# Patient Record
Sex: FEMALE | Race: WHITE | NOT HISPANIC OR LATINO | URBAN - METROPOLITAN AREA
[De-identification: names, ages, dates, MRNs, and addresses within clinical notes are randomized per-mention and may not be internally consistent; named-entity substitution may affect disease eponyms.]

---

## 2020-07-25 ENCOUNTER — TELEPHONE ENCOUNTER (OUTPATIENT)
Dept: URBAN - METROPOLITAN AREA CLINIC 13 | Facility: CLINIC | Age: 70
End: 2020-07-25

## 2020-07-26 ENCOUNTER — TELEPHONE ENCOUNTER (OUTPATIENT)
Dept: URBAN - METROPOLITAN AREA CLINIC 13 | Facility: CLINIC | Age: 70
End: 2020-07-26

## 2023-09-27 ENCOUNTER — OFFICE VISIT (OUTPATIENT)
Dept: URBAN - METROPOLITAN AREA CLINIC 107 | Facility: CLINIC | Age: 73
End: 2023-09-27
Payer: COMMERCIAL

## 2023-09-27 ENCOUNTER — LAB OUTSIDE AN ENCOUNTER (OUTPATIENT)
Dept: URBAN - METROPOLITAN AREA CLINIC 107 | Facility: CLINIC | Age: 73
End: 2023-09-27

## 2023-09-27 ENCOUNTER — WEB ENCOUNTER (OUTPATIENT)
Dept: URBAN - METROPOLITAN AREA CLINIC 107 | Facility: CLINIC | Age: 73
End: 2023-09-27

## 2023-09-27 VITALS
BODY MASS INDEX: 33.8 KG/M2 | HEART RATE: 80 BPM | HEIGHT: 64 IN | RESPIRATION RATE: 16 BRPM | DIASTOLIC BLOOD PRESSURE: 60 MMHG | TEMPERATURE: 97.3 F | WEIGHT: 198 LBS | SYSTOLIC BLOOD PRESSURE: 138 MMHG

## 2023-09-27 DIAGNOSIS — K76.89 LIVER LESION: ICD-10-CM

## 2023-09-27 DIAGNOSIS — D64.89 ANEMIA DUE TO OTHER CAUSE: ICD-10-CM

## 2023-09-27 DIAGNOSIS — K59.01 SLOW TRANSIT CONSTIPATION: ICD-10-CM

## 2023-09-27 DIAGNOSIS — R10.32 LEFT LOWER QUADRANT ABDOMINAL PAIN: ICD-10-CM

## 2023-09-27 PROCEDURE — 99204 OFFICE O/P NEW MOD 45 MIN: CPT | Performed by: NURSE PRACTITIONER

## 2023-09-27 RX ORDER — CARVEDILOL 6.25 MG/1
1 TABLET WITH FOOD TABLET, FILM COATED ORAL TWICE A DAY
Status: ACTIVE | COMMUNITY

## 2023-09-27 RX ORDER — FUROSEMIDE 20 MG/1
1 TABLET TABLET ORAL ONCE A DAY
Status: ACTIVE | COMMUNITY

## 2023-09-27 RX ORDER — DICYCLOMINE HYDROCHLORIDE 20 MG/1
1 TABLET TABLET ORAL
Qty: 45 TABLETS | Refills: 1 | OUTPATIENT
Start: 2023-09-27 | End: 2023-11-25

## 2023-09-27 RX ORDER — OXYBUTYNIN CHLORIDE 15 MG/1
1 TABLET TABLET, EXTENDED RELEASE ORAL ONCE A DAY
Status: ACTIVE | COMMUNITY

## 2023-09-27 RX ORDER — HYDROCHLOROTHIAZIDE 25 MG/1
1 TABLET IN THE MORNING TABLET ORAL ONCE A DAY
Status: ACTIVE | COMMUNITY

## 2023-09-27 RX ORDER — ATORVASTATIN CALCIUM 20 MG/1
1 TABLET TABLET, FILM COATED ORAL ONCE A DAY
Status: ACTIVE | COMMUNITY

## 2023-09-27 RX ORDER — DICLOFENAC SODIUM 75 MG/1
1 TABLET AS NEEDED TABLET, DELAYED RELEASE ORAL TWICE A DAY
Status: ACTIVE | COMMUNITY

## 2023-09-27 RX ORDER — DICYCLOMINE HYDROCHLORIDE 20 MG/1
1 TABLET TABLET ORAL THREE TIMES A DAY
Status: ACTIVE | COMMUNITY

## 2023-09-27 RX ORDER — HYDROXYCHLOROQUINE SULFATE 200 MG/1
AS DIRECTED TABLET, FILM COATED ORAL
Status: ACTIVE | COMMUNITY

## 2023-09-27 RX ORDER — POLYETHYLENE GLYCOL 3350, SODIUM SULFATE ANHYDROUS, SODIUM BICARBONATE, SODIUM CHLORIDE, POTASSIUM CHLORIDE 236; 22.74; 6.74; 5.86; 2.97 G/4L; G/4L; G/4L; G/4L; G/4L
AS DIRECTED POWDER, FOR SOLUTION ORAL ONCE
Qty: 236 GRAMS | Refills: 0 | OUTPATIENT
Start: 2023-09-27 | End: 2023-09-28

## 2023-09-27 RX ORDER — LISINOPRIL 20 MG/1
1 TABLET TABLET ORAL ONCE A DAY
Status: ACTIVE | COMMUNITY

## 2023-09-27 RX ORDER — OMEPRAZOLE 40 MG/1
1 CAPSULE 30 MINUTES BEFORE MORNING MEAL CAPSULE, DELAYED RELEASE ORAL ONCE A DAY
Status: ACTIVE | COMMUNITY

## 2023-09-27 NOTE — HPI-OTHER HISTORIES
Colonoscopy 9/22/2017 by Dr. Feng:two polyps removed from the ascending and sigmoid colon.  Pathology showed tubular adenoma and hyperplastic polyp.

## 2023-09-27 NOTE — HPI-TODAY'S VISIT:
73-year-old female with a history of hypertension, hyperlipidemia, sleep apnea, SLE, presenting for evaluation of abdominal pain. She was previously seen in the office in 2011 for evaluation of laryngopharyngeal reflux disease without significant GERD symptoms.  She reported no relief of voice hoarseness, cough, or globus sensation on twice daily PPI.  She underwent an EGD with Bravo pH testing on 3/21/2011.  This revealed a normal esophagus, stomach, and examined duodenum.  Peraza testing was negative for significant esophageal acid exposure, with total DeMeester score of 2.6.  Within the last few weeks, she has experienced intermittent but worsening abdominal pain. She complains of left lower quadrant discomfort which radiates to her hip and back. She reports associated constipation. The abdominal pain is not related to eating but does seem to subside with a bowel movement. MiraLAX is helpful as needed for constipation. The abdominal pain is also worsened with positional changes, particularly when she goes from sitting to standing. She has been seen in the ER twice in the last week, both at [facility]. She was started on dicyclomine with improvement. She provides labs which show anemia which has not been formally evaluated. She provides ER records from 9/23/23: H/H 9.4/29.8, MCV 88.2, Plt 217, WBC 4.72. CMP unremarkable with normal LFTs. Lipase 25. CT a/p with contrast: right renal pelvis prominence stable compared to prior and favors extrarenal pelvis, left hepatic lobe lesion.

## 2023-10-26 ENCOUNTER — TELEPHONE ENCOUNTER (OUTPATIENT)
Dept: URBAN - METROPOLITAN AREA CLINIC 107 | Facility: CLINIC | Age: 73
End: 2023-10-26

## 2023-11-02 ENCOUNTER — OFFICE VISIT (OUTPATIENT)
Dept: URBAN - METROPOLITAN AREA SURGERY CENTER 25 | Facility: SURGERY CENTER | Age: 73
End: 2023-11-02
Payer: COMMERCIAL

## 2023-11-02 DIAGNOSIS — K57.30 COLON, DIVERTICULOSIS: ICD-10-CM

## 2023-11-02 DIAGNOSIS — Z86.010 ADENOMAS PERSONAL HISTORY OF COLONIC POLYPS: ICD-10-CM

## 2023-11-02 DIAGNOSIS — K64.8 OTHER HEMORRHOIDS: ICD-10-CM

## 2023-11-02 DIAGNOSIS — Z12.11 COLON CANCER SCREENING (HIGH RISK): ICD-10-CM

## 2023-11-02 PROCEDURE — G0105 COLORECTAL SCRN; HI RISK IND: HCPCS | Performed by: INTERNAL MEDICINE

## 2023-11-02 PROCEDURE — 00811 ANES LWR INTST NDSC NOS: CPT | Performed by: ANESTHESIOLOGY

## 2023-11-02 PROCEDURE — 00811 ANES LWR INTST NDSC NOS: CPT | Performed by: ANESTHESIOLOGIST ASSISTANT

## 2023-11-02 PROCEDURE — G8907 PT DOC NO EVENTS ON DISCHARG: HCPCS | Performed by: INTERNAL MEDICINE

## 2023-11-02 RX ORDER — OMEPRAZOLE 40 MG/1
1 CAPSULE 30 MINUTES BEFORE MORNING MEAL CAPSULE, DELAYED RELEASE ORAL ONCE A DAY
Status: ACTIVE | COMMUNITY

## 2023-11-02 RX ORDER — DICYCLOMINE HYDROCHLORIDE 20 MG/1
1 TABLET TABLET ORAL
Qty: 45 TABLETS | Refills: 1 | Status: ACTIVE | COMMUNITY
Start: 2023-09-27 | End: 2023-11-25

## 2023-11-02 RX ORDER — DICLOFENAC SODIUM 75 MG/1
1 TABLET AS NEEDED TABLET, DELAYED RELEASE ORAL TWICE A DAY
Status: ACTIVE | COMMUNITY

## 2023-11-02 RX ORDER — CARVEDILOL 6.25 MG/1
1 TABLET WITH FOOD TABLET, FILM COATED ORAL TWICE A DAY
Status: ACTIVE | COMMUNITY

## 2023-11-02 RX ORDER — LISINOPRIL 20 MG/1
1 TABLET TABLET ORAL ONCE A DAY
Status: ACTIVE | COMMUNITY

## 2023-11-02 RX ORDER — ATORVASTATIN CALCIUM 20 MG/1
1 TABLET TABLET, FILM COATED ORAL ONCE A DAY
Status: ACTIVE | COMMUNITY

## 2023-11-02 RX ORDER — DICYCLOMINE HYDROCHLORIDE 20 MG/1
1 TABLET TABLET ORAL THREE TIMES A DAY
Status: ACTIVE | COMMUNITY

## 2023-11-02 RX ORDER — FUROSEMIDE 20 MG/1
1 TABLET TABLET ORAL ONCE A DAY
Status: ACTIVE | COMMUNITY

## 2023-11-02 RX ORDER — HYDROCHLOROTHIAZIDE 25 MG/1
1 TABLET IN THE MORNING TABLET ORAL ONCE A DAY
Status: ACTIVE | COMMUNITY

## 2023-11-02 RX ORDER — HYDROXYCHLOROQUINE SULFATE 200 MG/1
AS DIRECTED TABLET, FILM COATED ORAL
Status: ACTIVE | COMMUNITY

## 2023-11-02 RX ORDER — OXYBUTYNIN CHLORIDE 15 MG/1
1 TABLET TABLET, EXTENDED RELEASE ORAL ONCE A DAY
Status: ACTIVE | COMMUNITY

## 2023-11-29 ENCOUNTER — OFFICE VISIT (OUTPATIENT)
Dept: URBAN - METROPOLITAN AREA CLINIC 107 | Facility: CLINIC | Age: 73
End: 2023-11-29

## 2023-11-29 RX ORDER — CARVEDILOL 6.25 MG/1
1 TABLET WITH FOOD TABLET, FILM COATED ORAL TWICE A DAY
Status: ACTIVE | COMMUNITY

## 2023-11-29 RX ORDER — OXYBUTYNIN CHLORIDE 15 MG/1
1 TABLET TABLET, EXTENDED RELEASE ORAL ONCE A DAY
Status: ACTIVE | COMMUNITY

## 2023-11-29 RX ORDER — DICLOFENAC SODIUM 75 MG/1
1 TABLET AS NEEDED TABLET, DELAYED RELEASE ORAL TWICE A DAY
Status: ACTIVE | COMMUNITY

## 2023-11-29 RX ORDER — HYDROCHLOROTHIAZIDE 25 MG/1
1 TABLET IN THE MORNING TABLET ORAL ONCE A DAY
Status: ACTIVE | COMMUNITY

## 2023-11-29 RX ORDER — HYDROXYCHLOROQUINE SULFATE 200 MG/1
AS DIRECTED TABLET, FILM COATED ORAL
Status: ACTIVE | COMMUNITY

## 2023-11-29 RX ORDER — LISINOPRIL 20 MG/1
1 TABLET TABLET ORAL ONCE A DAY
Status: ACTIVE | COMMUNITY

## 2023-11-29 RX ORDER — DICYCLOMINE HYDROCHLORIDE 20 MG/1
1 TABLET TABLET ORAL THREE TIMES A DAY
Status: ACTIVE | COMMUNITY

## 2023-11-29 RX ORDER — FUROSEMIDE 20 MG/1
1 TABLET TABLET ORAL ONCE A DAY
Status: ACTIVE | COMMUNITY

## 2023-11-29 RX ORDER — ATORVASTATIN CALCIUM 20 MG/1
1 TABLET TABLET, FILM COATED ORAL ONCE A DAY
Status: ACTIVE | COMMUNITY

## 2023-11-29 RX ORDER — OMEPRAZOLE 40 MG/1
1 CAPSULE 30 MINUTES BEFORE MORNING MEAL CAPSULE, DELAYED RELEASE ORAL ONCE A DAY
Status: ACTIVE | COMMUNITY

## 2024-01-24 ENCOUNTER — LAB OUTSIDE AN ENCOUNTER (OUTPATIENT)
Dept: URBAN - METROPOLITAN AREA CLINIC 107 | Facility: CLINIC | Age: 74
End: 2024-01-24

## 2024-01-24 ENCOUNTER — OFFICE VISIT (OUTPATIENT)
Dept: URBAN - METROPOLITAN AREA CLINIC 107 | Facility: CLINIC | Age: 74
End: 2024-01-24
Payer: COMMERCIAL

## 2024-01-24 VITALS
SYSTOLIC BLOOD PRESSURE: 154 MMHG | RESPIRATION RATE: 16 BRPM | BODY MASS INDEX: 32.1 KG/M2 | WEIGHT: 188 LBS | TEMPERATURE: 97.1 F | HEART RATE: 61 BPM | HEIGHT: 64 IN | DIASTOLIC BLOOD PRESSURE: 73 MMHG

## 2024-01-24 DIAGNOSIS — K76.89 LIVER LESION: ICD-10-CM

## 2024-01-24 DIAGNOSIS — D64.89 ANEMIA DUE TO OTHER CAUSE: ICD-10-CM

## 2024-01-24 DIAGNOSIS — K59.01 SLOW TRANSIT CONSTIPATION: ICD-10-CM

## 2024-01-24 PROCEDURE — 99214 OFFICE O/P EST MOD 30 MIN: CPT | Performed by: NURSE PRACTITIONER

## 2024-01-24 RX ORDER — CARVEDILOL 6.25 MG/1
1 TABLET WITH FOOD TABLET, FILM COATED ORAL TWICE A DAY
Status: ACTIVE | COMMUNITY

## 2024-01-24 RX ORDER — FUROSEMIDE 20 MG/1
1 TABLET TABLET ORAL ONCE A DAY
Status: ACTIVE | COMMUNITY

## 2024-01-24 RX ORDER — DICLOFENAC SODIUM 75 MG/1
1 TABLET AS NEEDED TABLET, DELAYED RELEASE ORAL TWICE A DAY
Status: ACTIVE | COMMUNITY

## 2024-01-24 RX ORDER — LISINOPRIL 20 MG/1
1 TABLET TABLET ORAL ONCE A DAY
Status: ACTIVE | COMMUNITY

## 2024-01-24 RX ORDER — OMEPRAZOLE 40 MG/1
1 CAPSULE 30 MINUTES BEFORE MORNING MEAL CAPSULE, DELAYED RELEASE ORAL ONCE A DAY
Status: ACTIVE | COMMUNITY

## 2024-01-24 RX ORDER — ATORVASTATIN CALCIUM 20 MG/1
1 TABLET TABLET, FILM COATED ORAL ONCE A DAY
Status: ACTIVE | COMMUNITY

## 2024-01-24 RX ORDER — DICYCLOMINE HYDROCHLORIDE 20 MG/1
1 TABLET TABLET ORAL THREE TIMES A DAY
Status: ACTIVE | COMMUNITY

## 2024-01-24 RX ORDER — HYDROCHLOROTHIAZIDE 25 MG/1
1 TABLET IN THE MORNING TABLET ORAL ONCE A DAY
Status: ACTIVE | COMMUNITY

## 2024-01-24 RX ORDER — HYDROXYCHLOROQUINE SULFATE 200 MG/1
AS DIRECTED TABLET, FILM COATED ORAL
Status: ACTIVE | COMMUNITY

## 2024-01-24 RX ORDER — OXYBUTYNIN CHLORIDE 15 MG/1
1 TABLET TABLET, EXTENDED RELEASE ORAL ONCE A DAY
Status: ACTIVE | COMMUNITY

## 2024-01-24 NOTE — HPI-OTHER HISTORIES
She provides ER records from 9/23/23: H/H 9.4/29.8, MCV 88.2, Plt 217, WBC 4.72. CMP unremarkable with normal LFTs. Lipase 25. CT a/p with contrast: right renal pelvis prominence stable compared to prior and favors extrarenal pelvis, left hepatic lobe lesion.  Colonoscopy 9/22/2017 by Dr. Feng:two polyps removed from the ascending and sigmoid colon.  Pathology showed tubular adenoma and hyperplastic polyp.  EGD with Bravo pH testing 3/21/2011: normal esophagus, stomach, and examined duodenum. Peraza testing was negative for significant esophageal acid exposure, with total DeMeester score of 2.6.

## 2024-01-24 NOTE — HPI-TODAY'S VISIT:
73-year-old female with a history of hypertension, hyperlipidemia, sleep apnea, SLE, presenting for follow-up after colonoscopy.  She was seen in the office in September for evaluation of a recent exacerbation of left lower quadrant abdominal pain, suspected to be related to colon spasm secondary to underlying constipation given association with bowel movements and response to dicyclomine.  Recent CT showed no acute process.  She was instructed to increase MiraLAX to more routine use and continue dicyclomine as needed.  A colonoscopy was planned for polyp surveillance.  Regarding anemia, repeat labs to include CBC and iron studies were recommended at follow-up with consideration for EGD pending clinical course.  Recent CT demonstrated a liver lesion, likely complex cyst or hemangioma, for which MRI of the abdomen was recommended to further characterize.  Colonoscopy 11/2/2023:Mild diverticulosis in the sigmoid colon, nonbleeding internal hemorrhoids, otherwise unremarkable.  No specimens were collected.  Repeat colonoscopy not recommended due to age.  She has not experienced further abdominal pain. No nausea or vomiting. She has infrequent reflux which responds to omeprazole when needed. Her constipation is managed with MiraLAX, as long as she is consistent with taking it. She denies any blood in the stool. She complains of a decrease in appetite, stating she is only eating about 1 meal per day. She has unintentionally lost 10lb since the time of her last visit. She did not have the MRI performed for evaluation of the liver lesion. She has not had recent bloodwork to trend her hemoglobin. She admits to recent fatigue. She is not on oral iron. She takes diclofenac a few times per week for arthritis-related pain and BC powders on occasion for headaches.

## 2024-02-06 ENCOUNTER — EGD (OUTPATIENT)
Dept: URBAN - METROPOLITAN AREA SURGERY CENTER 25 | Facility: SURGERY CENTER | Age: 74
End: 2024-02-06
Payer: COMMERCIAL

## 2024-02-06 ENCOUNTER — LAB (OUTPATIENT)
Dept: URBAN - METROPOLITAN AREA CLINIC 4 | Facility: CLINIC | Age: 74
End: 2024-02-06
Payer: COMMERCIAL

## 2024-02-06 DIAGNOSIS — K31.5 DUODENAL STENOSIS: ICD-10-CM

## 2024-02-06 DIAGNOSIS — K31.89 OTHER DISEASES OF STOMACH AND DUODENUM: ICD-10-CM

## 2024-02-06 DIAGNOSIS — K25.9 GASTRIC ULCER WITHOUT HEMORRHAGE, PERFORATION, OR OBSTRUCTION, UNSPECIFIED ULCER CHRONICITY: ICD-10-CM

## 2024-02-06 DIAGNOSIS — D50.0 IRON DEFICIENCY ANEMIA SECONDARY TO BLOOD LOSS (CHRONIC): ICD-10-CM

## 2024-02-06 DIAGNOSIS — R63.4 ABNORMAL WEIGHT LOSS: ICD-10-CM

## 2024-02-06 DIAGNOSIS — K25.7 CHRONIC GASTRIC ULCER WITHOUT HEMORRHAGE OR PERFORATION: ICD-10-CM

## 2024-02-06 PROBLEM — 73481001: Status: ACTIVE | Noted: 2024-02-06

## 2024-02-06 PROCEDURE — 88342 IMHCHEM/IMCYTCHM 1ST ANTB: CPT | Performed by: PATHOLOGY

## 2024-02-06 PROCEDURE — 88341 IMHCHEM/IMCYTCHM EA ADD ANTB: CPT | Performed by: PATHOLOGY

## 2024-02-06 PROCEDURE — 43239 EGD BIOPSY SINGLE/MULTIPLE: CPT | Performed by: STUDENT IN AN ORGANIZED HEALTH CARE EDUCATION/TRAINING PROGRAM

## 2024-02-06 PROCEDURE — 88305 TISSUE EXAM BY PATHOLOGIST: CPT | Performed by: PATHOLOGY

## 2024-02-06 RX ORDER — OXYBUTYNIN CHLORIDE 15 MG/1
1 TABLET TABLET, EXTENDED RELEASE ORAL ONCE A DAY
Status: ACTIVE | COMMUNITY

## 2024-02-06 RX ORDER — LISINOPRIL 20 MG/1
1 TABLET TABLET ORAL ONCE A DAY
Status: ACTIVE | COMMUNITY

## 2024-02-06 RX ORDER — HYDROXYCHLOROQUINE SULFATE 200 MG/1
AS DIRECTED TABLET, FILM COATED ORAL
Status: ACTIVE | COMMUNITY

## 2024-02-06 RX ORDER — ATORVASTATIN CALCIUM 20 MG/1
1 TABLET TABLET, FILM COATED ORAL ONCE A DAY
Status: ACTIVE | COMMUNITY

## 2024-02-06 RX ORDER — FUROSEMIDE 20 MG/1
1 TABLET TABLET ORAL ONCE A DAY
Status: ACTIVE | COMMUNITY

## 2024-02-06 RX ORDER — DICYCLOMINE HYDROCHLORIDE 20 MG/1
1 TABLET TABLET ORAL THREE TIMES A DAY
Status: ACTIVE | COMMUNITY

## 2024-02-06 RX ORDER — DICLOFENAC SODIUM 75 MG/1
1 TABLET AS NEEDED TABLET, DELAYED RELEASE ORAL TWICE A DAY
Status: ACTIVE | COMMUNITY

## 2024-02-06 RX ORDER — OMEPRAZOLE 40 MG/1
1 CAPSULE 30 MINUTES BEFORE MORNING MEAL CAPSULE, DELAYED RELEASE ORAL ONCE A DAY
Status: ACTIVE | COMMUNITY

## 2024-02-06 RX ORDER — CARVEDILOL 6.25 MG/1
1 TABLET WITH FOOD TABLET, FILM COATED ORAL TWICE A DAY
Status: ACTIVE | COMMUNITY

## 2024-02-06 RX ORDER — HYDROCHLOROTHIAZIDE 25 MG/1
1 TABLET IN THE MORNING TABLET ORAL ONCE A DAY
Status: ACTIVE | COMMUNITY

## 2024-03-13 ENCOUNTER — OV EP (OUTPATIENT)
Dept: URBAN - METROPOLITAN AREA CLINIC 107 | Facility: CLINIC | Age: 74
End: 2024-03-13
Payer: COMMERCIAL

## 2024-03-13 VITALS
HEIGHT: 64 IN | HEART RATE: 66 BPM | DIASTOLIC BLOOD PRESSURE: 77 MMHG | SYSTOLIC BLOOD PRESSURE: 120 MMHG | TEMPERATURE: 97.7 F | BODY MASS INDEX: 31.41 KG/M2 | WEIGHT: 184 LBS

## 2024-03-13 DIAGNOSIS — D64.89 ANEMIA DUE TO OTHER CAUSE: ICD-10-CM

## 2024-03-13 DIAGNOSIS — K76.9 LIVER LESION: ICD-10-CM

## 2024-03-13 DIAGNOSIS — K52.89 (LYMPHOCYTIC) MICROSCOPIC COLITIS: ICD-10-CM

## 2024-03-13 PROCEDURE — 99214 OFFICE O/P EST MOD 30 MIN: CPT | Performed by: NURSE PRACTITIONER

## 2024-03-13 RX ORDER — DICYCLOMINE HYDROCHLORIDE 20 MG/1
1 TABLET TABLET ORAL THREE TIMES A DAY
Status: ACTIVE | COMMUNITY

## 2024-03-13 RX ORDER — FUROSEMIDE 20 MG/1
1 TABLET TABLET ORAL ONCE A DAY
Status: ACTIVE | COMMUNITY

## 2024-03-13 RX ORDER — OXYBUTYNIN CHLORIDE 15 MG/1
1 TABLET TABLET, EXTENDED RELEASE ORAL ONCE A DAY
Status: ACTIVE | COMMUNITY

## 2024-03-13 RX ORDER — CARVEDILOL 6.25 MG/1
1 TABLET WITH FOOD TABLET, FILM COATED ORAL TWICE A DAY
Status: ACTIVE | COMMUNITY

## 2024-03-13 RX ORDER — DICLOFENAC SODIUM 75 MG/1
1 TABLET AS NEEDED TABLET, DELAYED RELEASE ORAL TWICE A DAY
Status: ON HOLD | COMMUNITY

## 2024-03-13 RX ORDER — ATORVASTATIN CALCIUM 20 MG/1
1 TABLET TABLET, FILM COATED ORAL ONCE A DAY
Status: ACTIVE | COMMUNITY

## 2024-03-13 RX ORDER — HYDROCHLOROTHIAZIDE 25 MG/1
1 TABLET IN THE MORNING TABLET ORAL ONCE A DAY
Status: ACTIVE | COMMUNITY

## 2024-03-13 RX ORDER — LISINOPRIL 20 MG/1
1 TABLET TABLET ORAL ONCE A DAY
Status: ON HOLD | COMMUNITY

## 2024-03-13 RX ORDER — OMEPRAZOLE 40 MG/1
1 CAPSULE 30 MINUTES BEFORE MORNING AND EVENING MEAL CAPSULE, DELAYED RELEASE ORAL TWICE A DAY
Qty: 180 | Refills: 1 | Status: ACTIVE | COMMUNITY

## 2024-03-13 RX ORDER — HYDROXYCHLOROQUINE SULFATE 200 MG/1
AS DIRECTED TABLET, FILM COATED ORAL
Status: ACTIVE | COMMUNITY

## 2024-03-13 NOTE — HPI-TODAY'S VISIT:
73-year-old female with a history of hypertension, hyperlipidemia, sleep apnea, SLE, presenting for follow-up after EGD.  She was seen in the office in January for follow-up after surveillance colonoscopy, which was unremarkable aside from diverticulosis. Repeat colonoscopy not recommended for screening or polyp surveillance due to advanced age. Regarding anemia, colonoscopy was negative for soure. Labs were planned to trend her hemoglobin and assess for iron deficiency. Given associated weight loss, an upper endoscopy was planned. The differential included peptic ulcer disease related to NSAID use. In addition, prior CT demonstrated a liver lesion. She was to proceed with MRI as previously recommended to further characterize.  EGD 2/6/2024:Normal proximal esophagus, mid esophagus and distal esophagus.  Regular Z-line at 38 cm.  Widely patent Schatzki's ring.  Small hiatal hernia.  Nonobstructing nonbleeding gastric ulcers with a clean ulcer base, NSAID induced without evidence for perforation.  Erythematous duodenopathy.  Acquired duodenal stenosis.  Antral biopsy showed foveolar hyperplasia, negative for H. pylori.  Duodenal bulb biopsy was unremarkable, negative for sprue.  She was started on omeprazole 40 mg twice daily for 8 weeks and encouraged NSAID avoidance, with plan for repeat upper endoscopy in 6 weeks to document healing.  MRI of the abdomen with and without contrast 2/12/2024:Stable 2.3 cm rounded lesion adjacent to the proximal left portal vein with low-level internal enhancement which remains indeterminate; 6-month follow-up MRI recommended.  Additional 1.1 cm hemangioma within the periphery of hepatic segment VII.  Labs 2/12/2024:H/H10.3/31.8, MCV 83.9, , WBC 6.79.  CMP unremarkable aside from glucose 127, BUN/creat 28/1.28.  Iron 59.9, TIBC 254.10, iron sat 24.  Lipase 139.  Ferritin 122.  TSH 1.930.  Normal vitamin B12 and folate.  She is compliant with omeprazole twice daily and has avoided Diclofenac and any OTC NSAIDs, only taking Tylenol as required. She is without abdominal complaint. No abdominal pain, nausea or vomiting. Her bowel habits are regular without red blood per rectum or melena.

## 2024-03-13 NOTE — HPI-OTHER HISTORIES
Colonoscopy 11/2/2023:Mild diverticulosis in the sigmoid colon, nonbleeding internal hemorrhoids, otherwise unremarkable. No specimens were collected. Repeat colonoscopy not recommended due to age.  She provides ER records from 9/23/23: H/H 9.4/29.8, MCV 88.2, Plt 217, WBC 4.72. CMP unremarkable with normal LFTs. Lipase 25. CT a/p with contrast: right renal pelvis prominence stable compared to prior and favors extrarenal pelvis, left hepatic lobe lesion.  Colonoscopy 9/22/2017 by Dr. Feng:two polyps removed from the ascending and sigmoid colon.  Pathology showed tubular adenoma and hyperplastic polyp.  EGD with Bravo pH testing 3/21/2011: normal esophagus, stomach, and examined duodenum. Peraza testing was negative for significant esophageal acid exposure, with total DeMeester score of 2.6.

## 2024-04-08 ENCOUNTER — LAB (OUTPATIENT)
Dept: URBAN - METROPOLITAN AREA CLINIC 4 | Facility: CLINIC | Age: 74
End: 2024-04-08
Payer: COMMERCIAL

## 2024-04-08 ENCOUNTER — EGD (OUTPATIENT)
Dept: URBAN - METROPOLITAN AREA SURGERY CENTER 25 | Facility: SURGERY CENTER | Age: 74
End: 2024-04-08
Payer: COMMERCIAL

## 2024-04-08 DIAGNOSIS — K31.89 OTHER DISEASES OF STOMACH AND DUODENUM: ICD-10-CM

## 2024-04-08 DIAGNOSIS — K21.9 GASTROESOPHAGEAL REFLUX DISEASE: ICD-10-CM

## 2024-04-08 PROCEDURE — 88312 SPECIAL STAINS GROUP 1: CPT | Performed by: PATHOLOGY

## 2024-04-08 PROCEDURE — 43239 EGD BIOPSY SINGLE/MULTIPLE: CPT | Performed by: STUDENT IN AN ORGANIZED HEALTH CARE EDUCATION/TRAINING PROGRAM

## 2024-04-08 PROCEDURE — 88305 TISSUE EXAM BY PATHOLOGIST: CPT | Performed by: PATHOLOGY

## 2024-04-08 RX ORDER — LISINOPRIL 20 MG/1
1 TABLET TABLET ORAL ONCE A DAY
Status: ON HOLD | COMMUNITY

## 2024-04-08 RX ORDER — DICLOFENAC SODIUM 75 MG/1
1 TABLET AS NEEDED TABLET, DELAYED RELEASE ORAL TWICE A DAY
Status: ON HOLD | COMMUNITY

## 2024-04-08 RX ORDER — CARVEDILOL 6.25 MG/1
1 TABLET WITH FOOD TABLET, FILM COATED ORAL TWICE A DAY
Status: ACTIVE | COMMUNITY

## 2024-04-08 RX ORDER — OXYBUTYNIN CHLORIDE 15 MG/1
1 TABLET TABLET, EXTENDED RELEASE ORAL ONCE A DAY
Status: ACTIVE | COMMUNITY

## 2024-04-08 RX ORDER — HYDROXYCHLOROQUINE SULFATE 200 MG/1
AS DIRECTED TABLET, FILM COATED ORAL
Status: ACTIVE | COMMUNITY

## 2024-04-08 RX ORDER — OMEPRAZOLE 40 MG/1
1 CAPSULE 30 MINUTES BEFORE MORNING AND EVENING MEAL CAPSULE, DELAYED RELEASE ORAL TWICE A DAY
Qty: 180 | Refills: 1 | Status: ACTIVE | COMMUNITY

## 2024-04-08 RX ORDER — HYDROCHLOROTHIAZIDE 25 MG/1
1 TABLET IN THE MORNING TABLET ORAL ONCE A DAY
Status: ACTIVE | COMMUNITY

## 2024-04-08 RX ORDER — FUROSEMIDE 20 MG/1
1 TABLET TABLET ORAL ONCE A DAY
Status: ACTIVE | COMMUNITY

## 2024-04-08 RX ORDER — ATORVASTATIN CALCIUM 20 MG/1
1 TABLET TABLET, FILM COATED ORAL ONCE A DAY
Status: ACTIVE | COMMUNITY

## 2024-04-08 RX ORDER — DICYCLOMINE HYDROCHLORIDE 20 MG/1
1 TABLET TABLET ORAL THREE TIMES A DAY
Status: ACTIVE | COMMUNITY

## 2024-06-19 ENCOUNTER — OFFICE VISIT (OUTPATIENT)
Dept: URBAN - METROPOLITAN AREA CLINIC 107 | Facility: CLINIC | Age: 74
End: 2024-06-19
Payer: COMMERCIAL

## 2024-06-19 ENCOUNTER — DASHBOARD ENCOUNTERS (OUTPATIENT)
Age: 74
End: 2024-06-19

## 2024-06-19 VITALS
BODY MASS INDEX: 31.18 KG/M2 | HEIGHT: 64 IN | HEART RATE: 71 BPM | SYSTOLIC BLOOD PRESSURE: 162 MMHG | TEMPERATURE: 97 F | DIASTOLIC BLOOD PRESSURE: 90 MMHG | WEIGHT: 182.6 LBS

## 2024-06-19 DIAGNOSIS — K76.9 LIVER LESION: ICD-10-CM

## 2024-06-19 DIAGNOSIS — D50.8 ACHLORHYDRIC ANEMIA: ICD-10-CM

## 2024-06-19 DIAGNOSIS — K25.9 GASTRIC ULCER WITHOUT HEMORRHAGE OR PERFORATION, UNSPECIFIED CHRONICITY: ICD-10-CM

## 2024-06-19 PROCEDURE — 99214 OFFICE O/P EST MOD 30 MIN: CPT | Performed by: NURSE PRACTITIONER

## 2024-06-19 RX ORDER — OXYBUTYNIN CHLORIDE 15 MG/1
1 TABLET TABLET, EXTENDED RELEASE ORAL ONCE A DAY
Status: ON HOLD | COMMUNITY

## 2024-06-19 RX ORDER — OMEPRAZOLE 40 MG/1
1 CAPSULE 30 MINUTES BEFORE MORNING AND EVENING MEAL CAPSULE, DELAYED RELEASE ORAL TWICE A DAY
Qty: 180 | Refills: 1 | Status: ACTIVE | COMMUNITY

## 2024-06-19 RX ORDER — DICLOFENAC SODIUM 75 MG/1
1 TABLET AS NEEDED TABLET, DELAYED RELEASE ORAL TWICE A DAY
Status: ON HOLD | COMMUNITY

## 2024-06-19 RX ORDER — FUROSEMIDE 20 MG/1
1 TABLET TABLET ORAL ONCE A DAY
Status: ACTIVE | COMMUNITY

## 2024-06-19 RX ORDER — CARVEDILOL 6.25 MG/1
1 TABLET WITH FOOD TABLET, FILM COATED ORAL TWICE A DAY
Status: ACTIVE | COMMUNITY

## 2024-06-19 RX ORDER — HYDROXYCHLOROQUINE SULFATE 200 MG/1
AS DIRECTED TABLET, FILM COATED ORAL
Status: ACTIVE | COMMUNITY

## 2024-06-19 RX ORDER — ATORVASTATIN CALCIUM 20 MG/1
1 TABLET TABLET, FILM COATED ORAL ONCE A DAY
Status: ACTIVE | COMMUNITY

## 2024-06-19 RX ORDER — HYDROCHLOROTHIAZIDE 25 MG/1
1 TABLET IN THE MORNING TABLET ORAL ONCE A DAY
Status: ACTIVE | COMMUNITY

## 2024-06-19 RX ORDER — DICYCLOMINE HYDROCHLORIDE 20 MG/1
1 TABLET TABLET ORAL THREE TIMES A DAY
Status: ACTIVE | COMMUNITY

## 2024-06-19 RX ORDER — LISINOPRIL 20 MG/1
1 TABLET TABLET ORAL ONCE A DAY
Status: ON HOLD | COMMUNITY

## 2024-06-19 NOTE — HPI-TODAY'S VISIT:
74-year-old female with a history of hypertension, hyperlipidemia, sleep apnea, SLE, presenting for follow-up after EGD. She was seen in the office in March for follow-up after EGD, performed for evaluation of anemia. This revealed NSAID induced gastric ulcers and acquired duodenal stenosis. She was to continue twice daily PPI and encouraged NSAID avoidance, with plan for repeat upper endoscopy in the near future to assess healing. Gastric biopsies were negative for H. pylori. Regarding liver lesion noted on prior imaging, recent MRI demonstrated a stable 2.3 cm rounded lesion adjacent to the proximal left portal vein with low-level internal enhancement which remains indeterminate. A repeat MRI was recommended in 6 months for surveillance to ensure stability. EGD 4/8/24:Normal esophagus, regular Z-line, acute gastritis characterized by erythema, normal examined duodenum.  Gastric biopsies were negative for H. pylori. She continues to do well from a GI standpoint.  She denies any reflux symptoms on omeprazole twice daily.  No abdominal pain.  Blood in the stool.  She is compliant with NSAID avoidance.  Recent labs with her PCP 5/23 show a stable hemoglobin of 9.9, Iron saturation 30%, Ferritin 153.

## 2024-07-23 ENCOUNTER — TELEPHONE ENCOUNTER (OUTPATIENT)
Dept: URBAN - METROPOLITAN AREA CLINIC 113 | Facility: CLINIC | Age: 74
End: 2024-07-23

## 2024-07-24 ENCOUNTER — ERX REFILL RESPONSE (OUTPATIENT)
Dept: URBAN - METROPOLITAN AREA CLINIC 113 | Facility: CLINIC | Age: 74
End: 2024-07-24

## 2024-07-24 RX ORDER — OMEPRAZOLE 40 MG/1
1 CAPSULE 30 MINUTES BEFORE MORNING AND EVENING MEAL CAPSULE, DELAYED RELEASE ORAL TWICE A DAY
Qty: 180 | Refills: 1 | OUTPATIENT

## 2024-08-01 ENCOUNTER — LAB OUTSIDE AN ENCOUNTER (OUTPATIENT)
Dept: URBAN - METROPOLITAN AREA CLINIC 107 | Facility: CLINIC | Age: 74
End: 2024-08-01

## 2024-09-05 ENCOUNTER — TELEPHONE ENCOUNTER (OUTPATIENT)
Dept: URBAN - METROPOLITAN AREA CLINIC 107 | Facility: CLINIC | Age: 74
End: 2024-09-05

## 2024-10-02 NOTE — PHYSICAL EXAM LUNGS:
NO ANSWER, LEFT MESSAGE    FAXED REQUEST     no increased work of breathing or signs of respiratory distress

## 2024-10-23 ENCOUNTER — OFFICE VISIT (OUTPATIENT)
Dept: URBAN - METROPOLITAN AREA CLINIC 107 | Facility: CLINIC | Age: 74
End: 2024-10-23

## 2024-10-30 ENCOUNTER — OFFICE VISIT (OUTPATIENT)
Dept: URBAN - METROPOLITAN AREA CLINIC 107 | Facility: CLINIC | Age: 74
End: 2024-10-30
Payer: COMMERCIAL

## 2024-10-30 VITALS
WEIGHT: 189.6 LBS | HEIGHT: 64 IN | SYSTOLIC BLOOD PRESSURE: 134 MMHG | DIASTOLIC BLOOD PRESSURE: 75 MMHG | BODY MASS INDEX: 32.37 KG/M2 | HEART RATE: 67 BPM | TEMPERATURE: 97.7 F | RESPIRATION RATE: 18 BRPM | OXYGEN SATURATION: 98 %

## 2024-10-30 DIAGNOSIS — D64.9 NORMOCYTIC ANEMIA: ICD-10-CM

## 2024-10-30 DIAGNOSIS — K21.9 GERD: ICD-10-CM

## 2024-10-30 DIAGNOSIS — K76.9 LIVER LESION: ICD-10-CM

## 2024-10-30 PROCEDURE — 99214 OFFICE O/P EST MOD 30 MIN: CPT | Performed by: NURSE PRACTITIONER

## 2024-10-30 RX ORDER — HYDROXYCHLOROQUINE SULFATE 200 MG/1
AS DIRECTED TABLET, FILM COATED ORAL
Status: ACTIVE | COMMUNITY

## 2024-10-30 RX ORDER — OMEPRAZOLE 40 MG/1
1 CAPSULE 30 MINUTES BEFORE MORNING MEAL CAPSULE, DELAYED RELEASE ORAL ONCE A DAY
Qty: 90 | Refills: 4 | OUTPATIENT
Start: 2024-10-30

## 2024-10-30 RX ORDER — HYDROCHLOROTHIAZIDE 25 MG/1
1 TABLET IN THE MORNING TABLET ORAL ONCE A DAY
Status: ACTIVE | COMMUNITY

## 2024-10-30 RX ORDER — OXYBUTYNIN CHLORIDE 15 MG/1
1 TABLET TABLET, EXTENDED RELEASE ORAL ONCE A DAY
Status: ON HOLD | COMMUNITY

## 2024-10-30 RX ORDER — CARVEDILOL 6.25 MG/1
1 TABLET WITH FOOD TABLET, FILM COATED ORAL TWICE A DAY
Status: ACTIVE | COMMUNITY

## 2024-10-30 RX ORDER — DICYCLOMINE HYDROCHLORIDE 20 MG/1
1 TABLET TABLET ORAL THREE TIMES A DAY
Status: ACTIVE | COMMUNITY

## 2024-10-30 RX ORDER — DICLOFENAC SODIUM 75 MG/1
1 TABLET AS NEEDED TABLET, DELAYED RELEASE ORAL TWICE A DAY
Status: ON HOLD | COMMUNITY

## 2024-10-30 RX ORDER — LISINOPRIL 20 MG/1
1 TABLET TABLET ORAL ONCE A DAY
Status: ON HOLD | COMMUNITY

## 2024-10-30 RX ORDER — OMEPRAZOLE 40 MG/1
1 CAPSULE 30 MINUTES BEFORE MORNING AND EVENING MEAL CAPSULE, DELAYED RELEASE ORAL TWICE A DAY
Qty: 180 | Refills: 1 | Status: ACTIVE | COMMUNITY

## 2024-10-30 RX ORDER — FUROSEMIDE 20 MG/1
1 TABLET TABLET ORAL ONCE A DAY
Status: ACTIVE | COMMUNITY

## 2024-10-30 RX ORDER — ATORVASTATIN CALCIUM 20 MG/1
1 TABLET TABLET, FILM COATED ORAL ONCE A DAY
Status: ACTIVE | COMMUNITY

## 2024-10-30 NOTE — HPI-TODAY'S VISIT:
74-year-old female with a history of hypertension, hyperlipidemia, sleep apnea, SLE, GERD, peptic ulcer disease, presenting for follow-up. She was seen in the office in June for follow-up after EGD, which showed resolution of previous gastric ulcers.  She was to reduce omeprazole to once daily and continue indefinitely for management of GERD and GI prophylaxis.  She was encouraged ongoing NSAID avoidance.  Recent labs with her PCP showed a stable hemoglobin.  Regarding liver lesion noted on prior imaging, a surveillance MRI of the abdomen was recommended in August. MRI of the abdomen with and without contrast 7/4/2024:Stable 2 cm hepatic lesion adjacent to the portal vein with equivocal, mild enhancement. She continues to do well from a GI standpoint.  Her reflux symptoms are well-managed with omeprazole once daily.  She denies any trouble swallowing.  She has occasional abdominal discomfort which responds to dicyclomine if required.  No nausea or vomiting.  No red blood in the stool or melena.  She had labs last month with her PCP which reportedly showed a stable hemoglobin.

## 2024-10-30 NOTE — HPI-OTHER HISTORIES
Labs with her PCP 5/23/24 show a stable hemoglobin of 9.9, Iron saturation 30%, Ferritin 153.  EGD 4/8/24:Normal esophagus, regular Z-line, acute gastritis characterized by erythema, normal examined duodenum. Gastric biopsies were negative for H. pylori.  EGD 2/6/2024:Normal proximal esophagus, mid esophagus and distal esophagus. Regular Z-line at 38 cm. Widely patent Schatzki's ring. Small hiatal hernia. Nonobstructing nonbleeding gastric ulcers with a clean ulcer base, NSAID induced without evidence for perforation. Erythematous duodenopathy. Acquired duodenal stenosis. Antral biopsy showed foveolar hyperplasia, negative for H. pylori. Duodenal bulb biopsy was unremarkable, negative for sprue. She was started on omeprazole 40 mg twice daily for 8 weeks and encouraged NSAID avoidance, with plan for repeat upper endoscopy in 6 weeks to document healing.  MRI of the abdomen with and without contrast 2/12/2024:Stable 2.3 cm rounded lesion adjacent to the proximal left portal vein with low-level internal enhancement which remains indeterminate; 6-month follow-up MRI recommended. Additional 1.1 cm hemangioma within the periphery of hepatic segment VII.  Labs 2/12/2024:H/H10.3/31.8, MCV 83.9, , WBC 6.79. CMP unremarkable aside from glucose 127, BUN/creat 28/1.28. Iron 59.9, TIBC 254.10, iron sat 24. Lipase 139. Ferritin 122. TSH 1.930. Normal vitamin B12 and folate.  Colonoscopy 11/2/2023:Mild diverticulosis in the sigmoid colon, nonbleeding internal hemorrhoids, otherwise unremarkable. No specimens were collected. Repeat colonoscopy not recommended due to age.  She provides ER records from 9/23/23: H/H 9.4/29.8, MCV 88.2, Plt 217, WBC 4.72. CMP unremarkable with normal LFTs. Lipase 25. CT a/p with contrast: right renal pelvis prominence stable compared to prior and favors extrarenal pelvis, left hepatic lobe lesion.  Colonoscopy 9/22/2017 by Dr. Feng:two polyps removed from the ascending and sigmoid colon.  Pathology showed tubular adenoma and hyperplastic polyp.  EGD with Bravo pH testing 3/21/2011: normal esophagus, stomach, and examined duodenum. Peraza testing was negative for significant esophageal acid exposure, with total DeMeester score of 2.6.

## 2025-04-30 ENCOUNTER — LAB OUTSIDE AN ENCOUNTER (OUTPATIENT)
Dept: URBAN - METROPOLITAN AREA CLINIC 107 | Facility: CLINIC | Age: 75
End: 2025-04-30

## 2025-04-30 ENCOUNTER — OFFICE VISIT (OUTPATIENT)
Dept: URBAN - METROPOLITAN AREA CLINIC 107 | Facility: CLINIC | Age: 75
End: 2025-04-30
Payer: COMMERCIAL

## 2025-04-30 DIAGNOSIS — K21.9 GERD: ICD-10-CM

## 2025-04-30 DIAGNOSIS — R15.9 INCONTINENCE OF FECES: ICD-10-CM

## 2025-04-30 DIAGNOSIS — K76.9 LIVER LESION: ICD-10-CM

## 2025-04-30 PROCEDURE — 99214 OFFICE O/P EST MOD 30 MIN: CPT | Performed by: NURSE PRACTITIONER

## 2025-04-30 RX ORDER — FUROSEMIDE 20 MG/1
1 TABLET TABLET ORAL ONCE A DAY
Status: ACTIVE | COMMUNITY

## 2025-04-30 RX ORDER — OXYBUTYNIN CHLORIDE 15 MG/1
1 TABLET TABLET, EXTENDED RELEASE ORAL ONCE A DAY
Status: ON HOLD | COMMUNITY

## 2025-04-30 RX ORDER — HYDROXYCHLOROQUINE SULFATE 200 MG/1
AS DIRECTED TABLET, FILM COATED ORAL
Status: ACTIVE | COMMUNITY

## 2025-04-30 RX ORDER — DICLOFENAC SODIUM 75 MG/1
1 TABLET AS NEEDED TABLET, DELAYED RELEASE ORAL TWICE A DAY
Status: ON HOLD | COMMUNITY

## 2025-04-30 RX ORDER — ATORVASTATIN CALCIUM 20 MG/1
1 TABLET TABLET, FILM COATED ORAL ONCE A DAY
Status: ACTIVE | COMMUNITY

## 2025-04-30 RX ORDER — LISINOPRIL 20 MG/1
1 TABLET TABLET ORAL ONCE A DAY
Status: ON HOLD | COMMUNITY

## 2025-04-30 RX ORDER — CARVEDILOL 6.25 MG/1
1 TABLET WITH FOOD TABLET, FILM COATED ORAL TWICE A DAY
Status: ACTIVE | COMMUNITY

## 2025-04-30 RX ORDER — DICYCLOMINE HYDROCHLORIDE 20 MG/1
1 TABLET TABLET ORAL THREE TIMES A DAY
Status: ACTIVE | COMMUNITY

## 2025-04-30 RX ORDER — HYDROCHLOROTHIAZIDE 25 MG/1
1 TABLET IN THE MORNING TABLET ORAL ONCE A DAY
Status: ACTIVE | COMMUNITY

## 2025-04-30 RX ORDER — OMEPRAZOLE 40 MG/1
1 CAPSULE 30 MINUTES BEFORE MORNING MEAL CAPSULE, DELAYED RELEASE ORAL ONCE A DAY
Qty: 90 | Refills: 4 | Status: ACTIVE | COMMUNITY
Start: 2024-10-30

## 2025-04-30 RX ORDER — OMEPRAZOLE 40 MG/1
1 CAPSULE 30 MINUTES BEFORE MORNING MEAL CAPSULE, DELAYED RELEASE ORAL ONCE A DAY
OUTPATIENT
Start: 2024-10-30

## 2025-04-30 NOTE — HPI-TODAY'S VISIT:
Office visit 10/30/24 74-year-old female with a history of hypertension, hyperlipidemia, sleep apnea, SLE, GERD, peptic ulcer disease, presenting for follow-up. She was seen in the office in June for follow-up after EGD, which showed resolution of previous gastric ulcers.  She was to reduce omeprazole to once daily and continue indefinitely for management of GERD and GI prophylaxis.  She was encouraged ongoing NSAID avoidance.  Recent labs with her PCP showed a stable hemoglobin.  Regarding liver lesion noted on prior imaging, a surveillance MRI of the abdomen was recommended in August. MRI of the abdomen with and without contrast 10/4/2024:Stable 2 cm hepatic lesion adjacent to the portal vein with equivocal, mild enhancement. She continues to do well from a GI standpoint.  Her reflux symptoms are well-managed with omeprazole once daily.  She denies any trouble swallowing.  She has occasional abdominal discomfort which responds to dicyclomine if required.  No nausea or vomiting.  No red blood in the stool or melena.  She had labs last month with her PCP which reportedly showed a stable hemoglobin. Follow-up visit 4/30/2025 She was seen in the office in October for follow-up regarding GERD which was well-managed with omeprazole 40 mg daily.  Recent MRI demonstrated a stable liver lesion.  A repeat MRI was recommended in 6 to 12 months. Her GERD symptoms remain well-managed with omeprazole.  She has rare breakthrough heartburn.  No dysphagia.  No abdominal pain, nausea or vomiting.  She has 1-2 nonbloody stools per day.  She has occasional stool leakage when she urinates.  Infrequent constipation responds to MiraLAX if required.  No blood in the stool.

## 2025-05-08 NOTE — HPI-OTHER HISTORIES
EGD 2/6/2024:Normal proximal esophagus, mid esophagus and distal esophagus. Regular Z-line at 38 cm. Widely patent Schatzki's ring. Small hiatal hernia. Nonobstructing nonbleeding gastric ulcers with a clean ulcer base, NSAID induced without evidence for perforation. Erythematous duodenopathy. Acquired duodenal stenosis. Antral biopsy showed foveolar hyperplasia, negative for H. pylori. Duodenal bulb biopsy was unremarkable, negative for sprue. She was started on omeprazole 40 mg twice daily for 8 weeks and encouraged NSAID avoidance, with plan for repeat upper endoscopy in 6 weeks to document healing.  MRI of the abdomen with and without contrast 2/12/2024:Stable 2.3 cm rounded lesion adjacent to the proximal left portal vein with low-level internal enhancement which remains indeterminate; 6-month follow-up MRI recommended. Additional 1.1 cm hemangioma within the periphery of hepatic segment VII.  Labs 2/12/2024:H/H10.3/31.8, MCV 83.9, , WBC 6.79. CMP unremarkable aside from glucose 127, BUN/creat 28/1.28. Iron 59.9, TIBC 254.10, iron sat 24. Lipase 139. Ferritin 122. TSH 1.930. Normal vitamin B12 and folate.  Colonoscopy 11/2/2023:Mild diverticulosis in the sigmoid colon, nonbleeding internal hemorrhoids, otherwise unremarkable. No specimens were collected. Repeat colonoscopy not recommended due to age.  She provides ER records from 9/23/23: H/H 9.4/29.8, MCV 88.2, Plt 217, WBC 4.72. CMP unremarkable with normal LFTs. Lipase 25. CT a/p with contrast: right renal pelvis prominence stable compared to prior and favors extrarenal pelvis, left hepatic lobe lesion.  Colonoscopy 9/22/2017 by Dr. Feng:two polyps removed from the ascending and sigmoid colon.  Pathology showed tubular adenoma and hyperplastic polyp.  EGD with Bravo pH testing 3/21/2011: normal esophagus, stomach, and examined duodenum. Peraza testing was negative for significant esophageal acid exposure, with total DeMeester score of 2.6.
none